# Patient Record
Sex: FEMALE | Race: WHITE | URBAN - METROPOLITAN AREA
[De-identification: names, ages, dates, MRNs, and addresses within clinical notes are randomized per-mention and may not be internally consistent; named-entity substitution may affect disease eponyms.]

---

## 2017-01-20 ENCOUNTER — OFFICE VISIT (OUTPATIENT)
Dept: FAMILY MEDICINE | Facility: CLINIC | Age: 62
End: 2017-01-20
Payer: COMMERCIAL

## 2017-01-20 VITALS
BODY MASS INDEX: 42.49 KG/M2 | HEIGHT: 63 IN | SYSTOLIC BLOOD PRESSURE: 130 MMHG | HEART RATE: 68 BPM | RESPIRATION RATE: 16 BRPM | DIASTOLIC BLOOD PRESSURE: 76 MMHG | WEIGHT: 239.8 LBS | TEMPERATURE: 98.3 F

## 2017-01-20 DIAGNOSIS — R73.01 IMPAIRED FASTING GLUCOSE: ICD-10-CM

## 2017-01-20 DIAGNOSIS — Z12.31 VISIT FOR SCREENING MAMMOGRAM: ICD-10-CM

## 2017-01-20 DIAGNOSIS — E78.5 HYPERLIPIDEMIA WITH TARGET LDL LESS THAN 130: ICD-10-CM

## 2017-01-20 DIAGNOSIS — L98.9 SKIN LESION: ICD-10-CM

## 2017-01-20 DIAGNOSIS — E66.01 MORBID OBESITY DUE TO EXCESS CALORIES (H): ICD-10-CM

## 2017-01-20 DIAGNOSIS — Z00.00 ENCOUNTER FOR PREVENTIVE HEALTH EXAMINATION: Primary | ICD-10-CM

## 2017-01-20 DIAGNOSIS — Z12.4 SCREENING FOR MALIGNANT NEOPLASM OF CERVIX: ICD-10-CM

## 2017-01-20 DIAGNOSIS — Z11.59 NEED FOR HEPATITIS C SCREENING TEST: ICD-10-CM

## 2017-01-20 LAB
CHOLEST SERPL-MCNC: 253 MG/DL
GLUCOSE SERPL-MCNC: 115 MG/DL (ref 70–99)
HBA1C MFR BLD: 6.1 % (ref 4.3–6)
HCV AB SERPL QL IA: NORMAL
HDLC SERPL-MCNC: 53 MG/DL
LDLC SERPL CALC-MCNC: 173 MG/DL
NONHDLC SERPL-MCNC: 200 MG/DL
TRIGL SERPL-MCNC: 137 MG/DL

## 2017-01-20 PROCEDURE — 87624 HPV HI-RISK TYP POOLED RSLT: CPT | Performed by: FAMILY MEDICINE

## 2017-01-20 PROCEDURE — 82947 ASSAY GLUCOSE BLOOD QUANT: CPT | Performed by: FAMILY MEDICINE

## 2017-01-20 PROCEDURE — 80061 LIPID PANEL: CPT | Performed by: FAMILY MEDICINE

## 2017-01-20 PROCEDURE — 83036 HEMOGLOBIN GLYCOSYLATED A1C: CPT | Performed by: FAMILY MEDICINE

## 2017-01-20 PROCEDURE — G0145 SCR C/V CYTO,THINLAYER,RESCR: HCPCS | Performed by: FAMILY MEDICINE

## 2017-01-20 PROCEDURE — 36415 COLL VENOUS BLD VENIPUNCTURE: CPT | Performed by: FAMILY MEDICINE

## 2017-01-20 PROCEDURE — 99396 PREV VISIT EST AGE 40-64: CPT | Performed by: FAMILY MEDICINE

## 2017-01-20 PROCEDURE — 86803 HEPATITIS C AB TEST: CPT | Performed by: FAMILY MEDICINE

## 2017-01-20 NOTE — MR AVS SNAPSHOT
After Visit Summary   2017    Marcelle Cheema    MRN: [id number]           Patient Information     Date Of Birth          1955        Visit Information        Provider Department      2017 8:20 AM Jessica Nguyen MD [facility]        Today's Diagnoses     Encounter for preventive health examination    -  1     Hyperlipidemia with target LDL less than 130         Impaired fasting glucose         Skin lesion         Visit for screening mammogram         Need for hepatitis C screening test         Screening for malignant neoplasm of cervix           Care Instructions    To schedule a future appointment:    [facility] (xray, mammogram, bone density, and ultrasound) [phone]      [facility] [phone]        Preventive Health Recommendations  Female Ages 50 - 64    Yearly exam: See your health care provider every year in order to  o Review health changes.   o Discuss preventive care.    o Review your medicines if your doctor has prescribed any.      Get a Pap test every three years (unless you have an abnormal result and your provider advises testing more often).    If you get Pap tests with HPV test, you only need to test every 5 years, unless you have an abnormal result.     You do not need a Pap test if your uterus was removed (hysterectomy) and you have not had cancer.    You should be tested each year for STDs (sexually transmitted diseases) if you're at risk.     Have a mammogram every 1 to 2 years.    Have a colonoscopy at age 50, or have a yearly FIT test (stool test). These exams screen for colon cancer.      Have a cholesterol test every 5 years, or more often if advised.    Have a diabetes test (fasting glucose) every three years. If you are at risk for diabetes, you should have this test more often.     If you are at risk for osteoporosis (brittle bone disease), think about having a bone  density scan (DEXA).    Shots: Get a flu shot each year. Get a tetanus shot every 10 years.    Nutrition:     Eat at least 5 servings of fruits and vegetables each day.    Eat whole-grain bread, whole-wheat pasta and brown rice instead of white grains and rice.    Talk to your provider about Calcium and Vitamin D.     Lifestyle    Exercise at least 150 minutes a week (30 minutes a day, 5 days a week). This will help you control your weight and prevent disease.    Limit alcohol to one drink per day.    No smoking.     Wear sunscreen to prevent skin cancer.     See your dentist every six months for an exam and cleaning.    See your eye doctor every 1 to 2 years.            Follow-ups after your visit        Future tests that were ordered for you today     Open Future Orders        Priority Expected Expires Ordered    MA SCREENING DIGITAL BILAT - Future  (s+30) Routine  1/20/2018 1/20/2017            Who to contact     If you have questions or need follow up information about today's clinic visit or your schedule please contact [facility] directly at [phone].  Normal or non-critical lab and imaging results will be communicated to you by [employer], letter or phone within 4 business days after the clinic has received the results. If you do not hear from us within 7 days, please contact the clinic through [employer] or phone. If you have a critical or abnormal lab result, we will notify you by phone as soon as possible.  Submit refill requests through L2 or call your pharmacy and they will forward the refill request to us. Please allow 3 business days for your refill to be completed.          Additional Information About Your Visit        L2 Information     L2 gives you secure access to your electronic health record. If you see a primary care provider, you can also send messages to your care team and make appointments. If you have questions, please call your primary care clinic.  If you  "do not have a primary care provider, please call [phone] and they will assist you.        Care EveryWhere ID     This is your Care EveryWhere ID. This could be used by other organizations to access your [facility] medical records  [id number]        Your Vitals Were     Pulse Temperature Respirations Height BMI (Body Mass Index)       68 98.3  F (36.8  C) (Oral) 16 5' 2.5\" (1.588 m) 43.13 kg/m2        Blood Pressure from Last 3 Encounters:   01/20/17 138/80   01/19/16 138/82   01/14/16 132/78    Weight from Last 3 Encounters:   01/20/17 239 lb 12.8 oz (108.773 kg)   01/19/16 236 lb (107.049 kg)   01/14/16 236 lb (107.049 kg)              We Performed the Following     Glucose     Hemoglobin A1c     Hepatitis C Screen Reflex to HCV RNA Quant and Genotype     HPV High Risk Types DNA Cervical     Lipid panel reflex to direct LDL     Pap imaged thin layer screen with HPV - recommended age 30 - 65 years (select HPV order below)        Primary Care Provider Office Phone # Fax #    Jessica Nguyen [phone]       [address] [address]        Thank you!     Thank you for choosing [facility]  for your care. Our goal is always to provide you with excellent care. Hearing back from our patients is one way we can continue to improve our services. Please take a few minutes to complete the written survey that you may receive in the mail after your visit with us. Thank you!             Your Updated Medication List - Protect others around you: Learn how to safely use, store and throw away your medicines at www.disposemymeds.org.          This list is accurate as of: 1/20/17  9:06 AM.  Always use your most recent med list.                   Brand Name Dispense Instructions for use    atorvastatin 40 MG tablet    LIPITOR    90 tablet    Take 1 tablet (40 mg) by mouth daily         "

## 2017-01-20 NOTE — PROGRESS NOTES
SUBJECTIVE:     CC: Marcelle Cheema is an 61 year old woman who presents for preventive health visit.     Healthy Habits:    Do you get at least three servings of calcium containing foods daily (dairy, green leafy vegetables, etc.)? yes    Amount of exercise or daily activities, outside of work: 0 day(s) per week    Problems taking medications regularly No    Medication side effects: No    Have you had an eye exam in the past two years? no    Do you see a dentist twice per year? yes  Do you have sleep apnea, excessive snoring or daytime drowsiness?no    Mood   High amounts of stress with work, parents health, son's emotional health  She works 12 hour days, not getting regular sleep and over eating due to stress    Today's PHQ-2 Score:   PHQ-2 ( 1999 [employer]) 1/20/2017 10/26/2015   Q1: Little interest or pleasure in doing things 0 -   Q2: Feeling down, depressed or hopeless 1 -   PHQ-2 Score 1 -   Little interest or pleasure in doing things - Not at all   Feeling down, depressed or hopeless - Not at all   PHQ-2 Score - 0       Abuse: Current or Past(Physical, Sexual or Emotional)- No  Do you feel safe in your environment - Yes    Social History   Substance Use Topics     Smoking status: Never Smoker      Smokeless tobacco: Never Used     Alcohol Use: Yes      Comment: Rare     The patient does not drink >3 drinks per day nor >7 drinks per week.    Recent Labs   Lab Test  10/28/15   0741  02/26/14   1130   CHOL  166  185   HDL  48*  46*   LDL  93  110   TRIG  124  141   CHOLHDLRATIO  3.5  4.0       Reviewed orders with patient.  Reviewed health maintenance and updated orders accordingly - Yes    Mammo Decision Support:  Patient over age 50, mutual decision to screen reflected in health maintenance.    Pertinent mammograms are reviewed under the imaging tab.  History of abnormal Pap smear: YES - updated in Problem List and Health Maintenance accordingly  All Histories reviewed and updated in Epic.      ROS:  C:  "NEGATIVE for fever, chills, change in weight  I: NEGATIVE for worrisome rashes, moles or lesions  E: NEGATIVE for vision changes or irritation  ENT: NEGATIVE for ear, mouth and throat problems  R: NEGATIVE for significant cough or SOB  B: NEGATIVE for masses, tenderness or discharge  CV: NEGATIVE for chest pain, palpitations or peripheral edema  GI: NEGATIVE for nausea, abdominal pain, heartburn, or change in bowel habits  : NEGATIVE for unusual urinary or vaginal symptoms. No vaginal bleeding.  M: NEGATIVE for significant arthralgias or myalgia  N: NEGATIVE for weakness, dizziness or paresthesias  P: NEGATIVE for changes in mood or affect     This document serves as a record of the services and decisions personally performed and made by Jessica Nguyen. It was created on her behalf by Maria Luisa Palencia, a trained medical scribe. The creation of this document is based the provider's statements to the medical scribe.  Maria Luisa Palencia January 20, 2017 9:05 AM    Problem list, Medication list, Allergies, and Medical/Social/Surgical histories reviewed in [facility] and updated as appropriate.  OBJECTIVE:     /80 mmHg  Pulse 68  Temp(Src) 98.3  F (36.8  C) (Oral)  Resp 16  Ht 5' 2.5\" (1.588 m)  Wt 239 lb 12.8 oz (108.773 kg)  BMI 43.13 kg/m2  EXAM:  GENERAL APPEARANCE: healthy, alert, no distress and morbidly obese  EYES: Eyes grossly normal to inspection, PERRL and conjunctivae and sclerae normal  HENT: ear canals and TM's normal, nose and mouth without ulcers or lesions, oropharynx clear and oral mucous membranes moist  NECK: no adenopathy, no asymmetry, masses, or scars and thyroid normal to palpation  RESP: lungs clear to auscultation - no rales, rhonchi or wheezes  BREAST: normal without masses, tenderness or nipple discharge and no palpable axillary masses or adenopathy  CV: regular rate and rhythm, normal S1 S2, no S3 or S4, no murmur, click or rub, no peripheral edema and peripheral pulses strong  ABDOMEN: " soft, nontender, no hepatosplenomegaly, no masses and bowel sounds normal   (female): normal female external genitalia, normal urethral meatus, vaginal mucosal atrophy noted and difficult to visualize the cervix due to discomfort and body habitus  MS: no musculoskeletal defects are noted and gait is age appropriate without ataxia  SKIN: small superficial brown spot on left areola, appears like a keratosis.    Mild irritation noted around left nipple, erythematous  NEURO: Normal strength and tone, sensory exam grossly normal, mentation intact and speech normal  PSYCH: mentation appears normal and affect normal/bright    ASSESSMENT/PLAN:     Marcelle was seen today for physical and medication reconciliation.    Diagnoses and all orders for this visit:    Encounter for preventive health examination    Hyperlipidemia with target LDL less than 130  -     Lipid panel reflex to direct LDL  -  patient has not been compliant with statin and is willing to go back on it     overweight with BMI >40   - discussed stress reduction, offered therapy which she declines at this time     Impaired fasting glucose  -     Glucose  -     Hemoglobin A1c    Skin lesion   - she will make an appointment with her dermatologist     I want her to have the skin lesion on left breast as well as nipple itching discussed    Visit for screening mammogram  -     MA SCREENING DIGITAL BILAT - Future  (s+30); Future      Need for hepatitis C screening test  -     Hepatitis C Screen Reflex to HCV RNA Quant and Genotype    Screening for malignant neoplasm of cervix  -     HPV High Risk Types DNA Cervical  -     Pap imaged thin layer screen with HPV - recommended age 30 - 65 years (select HPV order below)            COUNSELING:   Reviewed preventive health counseling, as reflected in patient instructions       Regular exercise       Healthy diet/nutrition       Vision screening       Hearing screening       Osteoporosis Prevention/Bone Health       Colon  "cancer screening       Consider Hep C screening for patients born between 1945 and 1965       The 10-year ASCVD risk score (Bernard ALONZO JrPhyllis, et al., 2013) is: 4%    Values used to calculate the score:      Age: 61 years      Sex: Female      Is an : No      Diabetic: No      Tobacco smoker: No      Systolic Blood Pressure: 138 mmHg      Prescribed Antihypertensives: No      HDL Cholesterol: 48 mg/dL      Total Cholesterol: 166 mg/dL    BP Screening:   Last 3 BP Readings:    BP Readings from Last 3 Encounters:   01/20/17 138/80   01/19/16 138/82   01/14/16 132/78       The following was recommended to the patient:  Re-screen BP within a year and recommended lifestyle modifications       reports that she has never smoked. She has never used smokeless tobacco.    Estimated body mass index is 43.13 kg/(m^2) as calculated from the following:    Height as of this encounter: 5' 2.5\" (1.588 m).    Weight as of this encounter: 239 lb 12.8 oz (108.773 kg).   Weight management plan: Discussed healthy diet and exercise guidelines and patient will follow up in 12 months in clinic to re-evaluate.    Counseling Resources:  ATP IV Guidelines  Pooled Cohorts Equation Calculator  Breast Cancer Risk Calculator  FRAX Risk Assessment  ICSI Preventive Guidelines  Dietary Guidelines for Americans, 2010  USDA's MyPlate  ASA Prophylaxis  Lung CA Screening    The information in this document, created by the medical scribe, Maria Luisa Palencia, for me, accurately reflects the services I personally performed and the decisions made by me. I have reviewed and approved this document for accuracy prior to leaving the patient care area.  Jessica Nguyen, January 20, 2017 9:05 AM      Jessica Nguyen MD  [facility]  "

## 2017-01-20 NOTE — NURSING NOTE
"Chief Complaint   Patient presents with     Physical     Patient is fasting today     Medication Reconciliation     Patient has NOT been taking her Lipitor x few months       Initial /80 mmHg  Pulse 68  Temp(Src) 98.3  F (36.8  C) (Oral)  Resp 16  Ht 5' 2.5\" (1.588 m)  Wt 239 lb 12.8 oz (108.773 kg)  BMI 43.13 kg/m2 Estimated body mass index is 43.13 kg/(m^2) as calculated from the following:    Height as of this encounter: 5' 2.5\" (1.588 m).    Weight as of this encounter: 239 lb 12.8 oz (108.773 kg).  BP completed using cuff size: large    "

## 2017-01-20 NOTE — PATIENT INSTRUCTIONS
To schedule a future appointment:    [facility] (xray, mammogram, bone density, and ultrasound) [phone]      [facility] [phone]        Preventive Health Recommendations  Female Ages 50 - 64    Yearly exam: See your health care provider every year in order to  o Review health changes.   o Discuss preventive care.    o Review your medicines if your doctor has prescribed any.      Get a Pap test every three years (unless you have an abnormal result and your provider advises testing more often).    If you get Pap tests with HPV test, you only need to test every 5 years, unless you have an abnormal result.     You do not need a Pap test if your uterus was removed (hysterectomy) and you have not had cancer.    You should be tested each year for STDs (sexually transmitted diseases) if you're at risk.     Have a mammogram every 1 to 2 years.    Have a colonoscopy at age 50, or have a yearly FIT test (stool test). These exams screen for colon cancer.      Have a cholesterol test every 5 years, or more often if advised.    Have a diabetes test (fasting glucose) every three years. If you are at risk for diabetes, you should have this test more often.     If you are at risk for osteoporosis (brittle bone disease), think about having a bone density scan (DEXA).    Shots: Get a flu shot each year. Get a tetanus shot every 10 years.    Nutrition:     Eat at least 5 servings of fruits and vegetables each day.    Eat whole-grain bread, whole-wheat pasta and brown rice instead of white grains and rice.    Talk to your provider about Calcium and Vitamin D.     Lifestyle    Exercise at least 150 minutes a week (30 minutes a day, 5 days a week). This will help you control your weight and prevent disease.    Limit alcohol to one drink per day.    No smoking.     Wear sunscreen to prevent skin cancer.     See your dentist every six months for an exam and  cleaning.    See your eye doctor every 1 to 2 years.

## 2017-01-24 LAB
COPATH REPORT: NORMAL
PAP: NORMAL

## 2017-01-26 LAB
FINAL DIAGNOSIS: NORMAL
HPV HR 12 DNA CVX QL NAA+PROBE: NEGATIVE
HPV16 DNA SPEC QL NAA+PROBE: NEGATIVE
HPV18 DNA SPEC QL NAA+PROBE: NEGATIVE
SPECIMEN DESCRIPTION: NORMAL

## 2017-02-02 ENCOUNTER — RADIANT APPOINTMENT (OUTPATIENT)
Dept: MAMMOGRAPHY | Facility: CLINIC | Age: 62
End: 2017-02-02
Attending: FAMILY MEDICINE

## 2017-02-02 DIAGNOSIS — Z12.31 VISIT FOR SCREENING MAMMOGRAM: ICD-10-CM

## 2017-03-22 DIAGNOSIS — E78.5 HYPERLIPIDEMIA WITH TARGET LDL LESS THAN 130: ICD-10-CM

## 2017-03-22 NOTE — TELEPHONE ENCOUNTER
Medication Detail      Disp Refills Start End SEAN   atorvastatin (LIPITOR) 40 MG tablet 90 tablet 3 10/28/2015  No   Sig: Take 1 tablet (40 mg) by mouth daily   Class: E-Prescribe   Notes to Pharmacy: Profile Rx: patient will contact pharmacy when needed   Route: Oral       Last Office Visit with [facility], [facility] or [facility] prescribing provider: 1/20/2017       Lab Results   Component Value Date    CHOL 253 01/20/2017     Lab Results   Component Value Date    HDL 53 01/20/2017     Lab Results   Component Value Date     01/20/2017     Lab Results   Component Value Date    TRIG 137 01/20/2017     Lab Results   Component Value Date    CHOLHDLRATIO 3.5 10/28/2015

## 2017-03-24 RX ORDER — ATORVASTATIN CALCIUM 40 MG/1
TABLET, FILM COATED ORAL
Qty: 90 TABLET | Refills: 2 | Status: SHIPPED | OUTPATIENT
Start: 2017-03-24 | End: 2018-01-26

## 2018-01-26 DIAGNOSIS — E78.5 HYPERLIPIDEMIA WITH TARGET LDL LESS THAN 130: ICD-10-CM

## 2018-01-29 NOTE — TELEPHONE ENCOUNTER
"Requested Prescriptions   Pending Prescriptions Disp Refills     atorvastatin (LIPITOR) 40 MG tablet [Pharmacy Med Name: ATORVASTATIN TAB 40MG]  Last Written Prescription Date:  3/24/2017  Last Fill Quantity: 90 tablet,  # refills: 2   Last Office Visit with G, P or [facility] prescribing provider:  1/20/2017  Future Office Visit:      90 tablet 2     Sig: TAKE 1 TABLET DAILY    Statins Protocol Failed    1/26/2018  7:51 PM       Failed - LDL on file in past 12 months    Recent Labs   Lab Test  01/20/17   0914   LDL  173*            Failed - Recent or future visit with authorizing provider    Patient had office visit in the last year or has a visit in the next 30 days with authorizing provider.  See \"Patient Info\" tab in inbasket, or \"Choose Columns\" in Meds & Orders section of the refill encounter.            Passed - No abnormal creatine kinase in past 12 months    No lab results found.         Passed - Patient is age 18 or older       Passed - No active pregnancy on record       Passed - No positive pregnancy test in past 12 months          "

## 2018-02-01 RX ORDER — ATORVASTATIN CALCIUM 40 MG/1
40 TABLET, FILM COATED ORAL DAILY
Qty: 90 TABLET | Refills: 0 | Status: SHIPPED | OUTPATIENT
Start: 2018-02-01

## 2018-02-01 NOTE — TELEPHONE ENCOUNTER
Medication is being filled for 1 time refill only due to:  Patient needs to be seen because she needs to establish care with another provider..   Santos Squires RN

## 2018-02-07 ENCOUNTER — RADIANT APPOINTMENT (OUTPATIENT)
Dept: MAMMOGRAPHY | Facility: CLINIC | Age: 63
End: 2018-02-07
Payer: COMMERCIAL

## 2018-02-07 DIAGNOSIS — Z12.39 SCREENING FOR BREAST CANCER: ICD-10-CM

## 2019-10-04 ENCOUNTER — HEALTH MAINTENANCE LETTER (OUTPATIENT)
Age: 64
End: 2019-10-04

## 2020-11-08 ENCOUNTER — HEALTH MAINTENANCE LETTER (OUTPATIENT)
Age: 65
End: 2020-11-08

## 2021-01-18 ENCOUNTER — OFFICE VISIT - RIVER FALLS (OUTPATIENT)
Dept: FAMILY MEDICINE | Facility: CLINIC | Age: 66
End: 2021-01-18

## 2021-01-18 ASSESSMENT — MIFFLIN-ST. JEOR: SCORE: 1631.78

## 2021-02-22 ENCOUNTER — OFFICE VISIT - RIVER FALLS (OUTPATIENT)
Dept: FAMILY MEDICINE | Facility: CLINIC | Age: 66
End: 2021-02-22

## 2021-02-22 ASSESSMENT — MIFFLIN-ST. JEOR: SCORE: 1594.58

## 2021-09-11 ENCOUNTER — HEALTH MAINTENANCE LETTER (OUTPATIENT)
Age: 66
End: 2021-09-11

## 2022-01-01 ENCOUNTER — HEALTH MAINTENANCE LETTER (OUTPATIENT)
Age: 67
End: 2022-01-01

## 2022-02-11 VITALS
BODY MASS INDEX: 44.68 KG/M2 | HEIGHT: 62 IN | DIASTOLIC BLOOD PRESSURE: 83 MMHG | SYSTOLIC BLOOD PRESSURE: 120 MMHG | WEIGHT: 242.8 LBS

## 2022-02-11 VITALS — WEIGHT: 251 LBS | HEIGHT: 62 IN | BODY MASS INDEX: 46.19 KG/M2

## 2022-02-16 NOTE — LETTER
(Inserted Image. Unable to display)   August 26, 2021  CARIN SANTAMARIA  [address] [place] [id number]          Dear CARIN,      Thank you for selecting [facility] for your healthcare needs.    Your Healthcare Provider has recommended that you schedule a diabetes management appointment with our Certified Diabetes Educator, Cindy Zamarripa RD, CD, CDE.     Please bring your glucose meter and your blood glucose diary to your appointment.    Available services include:  - Education about diabetes with guidance and support   - Education on the 7 Self Care Behaviors for Diabetes Management:     Healthy Eating   portion control, label readings, carbohydrate recommendations, heart healthy guidelines, meal planning    Being Active - Physical activity guidelines     Monitoring   blood glucose targets, evaluation of blood glucose readings and lab results    Taking Medication   medication management    Problem Solving   discuss any concerns/ questions     Healthy Coping   disease progression and management    Reducing Risks   prevention strategies to help avoid potential complications related to uncontrolled diabetes  - Weight loss strategies      (FYI   Regarding office visit: In some instances, a video visit may be offered as an option.)        To schedule an appointment or if you have further questions, please contact your clinic at [phone].      Powered by [employer]    Sincerely,    Cindy Zamarripa R.D., C.D., C.D.E.

## 2022-02-16 NOTE — NURSING NOTE
Quick Intake Entered On:  1/20/2021 4:51 PM CST    Performed On:  1/18/2021 4:51 PM CST by Cindy Zamarripa               Summary   Weight Measured :   251 lb(Converted to: 251 lb 0 oz, 113.852 kg)    Height Measured :   62 in(Converted to: 5 ft 2 in, 157.48 cm)    Body Mass Index :   45.9 kg/m2 (HI)    Body Surface Area :   2.23 m2   Cindy Zamarripa - 1/20/2021 4:51 PM CST

## 2022-02-16 NOTE — PROGRESS NOTES
Patient:   CARIN SANTAMARIA            MRN: [id number]            FIN: [id number]               Age:   65 years     Sex:  Female     :  1955   Associated Diagnoses:   Type 2 diabetes mellitus without complications; Morbid (severe) obesity due to excess calories; Mixed hyperlipidemia; Essential (primary) hypertension   Author:   Cindy Zamarripa      Visit Information   Visit type:  Diabetes Self Management Education .    Referral source:  Ryan KOWALSKI, [facility] .       Chief Complaint   DM Type II, HTN, Hyperlipidemia, Obesity       History of Present Illness   Discussed nutrition, diabetes, and lifestyle management with pt.  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   Stress:   mod  Sleep: will instruct during follow up consults   Support:   BG Testing: education today   DM related medication: none at this time   Routine diabetes care:  will discuss during follow up consults    Dilated Retinal Eye Exam:    Skin:   Dental:   Feet:   Immunizations:   Hgb A1c: 6.9% = 151 mg/dL estimated average glucose      Health Status   Allergies:    Allergic Reactions (Selected)  No known allergies   Medications:    Medications          No medications documented     Problem list:    All Problems (Selected)  Essential (primary) hypertension / SNOMED CT 30331836 / Confirmed  Mixed hyperlipidemia / SNOMED CT 444919516 / Confirmed  Morbid (severe) obesity due to excess calories / SNOMED CT 049662619 / Confirmed  Type 2 diabetes mellitus without complications / SNOMED CT 797170964 / Confirmed      Histories   Past Medical History:    Active  Mixed hyperlipidemia (572281199)  Type 2 diabetes mellitus without complications ()  Essential (primary)  hypertension (96846974)  Morbid (severe) obesity due to excess calories (819686660)  Resolved  History of iritis (438992605):  Resolved.  Comments:  4/3/2018 CDT 3:52 PM CDT - Stephenie Vasques  Only one episode.   Family History:    No family history items have been selected or recorded.   Procedure history:    Mammogram (SNOMED CT 717603100) on 11/11/2019 at 64 Years.  Neurological stereotactic surgery system (SNOMED CT 4191969632).  Comments:  4/3/2018 3:51 PM CDT - Stephenie Vasques  1990s - AV malformation with a intracranial bleed.  D&C - Dilatation and curettage (SNOMED CT 2900507967).  Tonsillectomy (SNOMED CT 386037325).   Social History:        Alcohol Assessment: Denies Alcohol Use      Tobacco Assessment: Past      Employment and Education Assessment            Work/School description: [employer].      Home and Environment Assessment            Marital status: .  Spouse/Partner name: Judson.      Nutrition and Health Assessment            Type of diet: Regular.      Exercise and Physical Activity Assessment: Does not exercise        Impression and Plan   Diagnosis     Type 2 diabetes mellitus without complications (XBX44-JY E11.9).     Morbid (severe) obesity due to excess calories (VIP16-BH E66.01).     Mixed hyperlipidemia (NET54-VG E78.2).     Essential (primary) hypertension (BPJ54-OS I10).         Counseled: Patient, SHIRLENE Self Care Behaviors   Today the patient was instructed on the basic physiology of diabetes mellitus, BG testing, and lifestyle guidelines.  Healthy Eating - Education on what foods are primary sources of carbohydrates and how intake affects BG readings, edu on carbohydrate counting, reading food labels, appropriate portion sizes, well balanced meals, diabetic plate method as a general rule.  Patient is provided with numerous ideas to incorporate dietary recommendations, meal planning and preparation, mindful eating techniques and weight loss tips.    Being Active  - Education on how exercise helps with :    - lowering BG by increasing the muscles ability to take up and use glucose   - weight loss   - healthier heart (improve lipid profile)   - improve sleep, mood, energy   - decrease stress      Monitoring - Today the patient is instructed on home blood glucose monitoring.  Pt is provided with a meter.  Pt is instructed on the proper use of the meter, recommended testing schedule and blood glucose target levels.  The patient is able to return appropriate demonstration of the use of the meter.  Pt is instructed on proper disposal of lancets.    Taking Medication - Discussed gradual progression of diabetes and potential to add medication in the future.      Problem Solving - medical support team assistance, resources provided (written and apps, internet); good control of stress  Healthy Coping - support of friends, family, and medical support team   Reducing Risks - Will discuss at f/u apts.  Weight loss goal of 7 - 10% of current body weight pounds as a reasonable goal to help increase insulin sensitivity   .        Goals:  1.  A1c <6.5%  2.  BG testing 2x/ day on 3 days/ week before eating 80 - 130 target; two hours after eating 80 - 150mg/dL  3.  Physical active 60 min/ week   4.  Carb controlled heart healthy meal plan <130gm CHO/ day; <200mg Cholesterol/ day   5.  F/u in 6 weeks       Professional Services   Time spent with pt 60 min  cc Dr. Davis   cc Dr. [facility]

## 2022-02-16 NOTE — NURSING NOTE
Quick Intake Entered On:  2/22/2021 1:59 PM CST    Performed On:  2/22/2021 1:57 PM CST by Cindy Zamarripa               Summary   Weight Measured :   242.8 lb(Converted to: 242 lb 13 oz, 110.132 kg)    Height Measured :   62 in(Converted to: 5 ft 2 in, 157.48 cm)    Body Mass Index :   44.4 kg/m2 (HI)    Body Surface Area :   2.19 m2   Systolic Blood Pressure :   120 mmHg   Diastolic Blood Pressure :   83 mmHg (HI)    Mean Arterial Pressure :   95 mmHg   Cindy Zamarripa - 2/22/2021 1:57 PM CST

## 2022-04-23 ENCOUNTER — HEALTH MAINTENANCE LETTER (OUTPATIENT)
Age: 67
End: 2022-04-23

## 2022-10-29 ENCOUNTER — HEALTH MAINTENANCE LETTER (OUTPATIENT)
Age: 67
End: 2022-10-29

## 2023-04-08 ENCOUNTER — HEALTH MAINTENANCE LETTER (OUTPATIENT)
Age: 68
End: 2023-04-08

## 2024-08-05 NOTE — TELEPHONE ENCOUNTER
Prescription approved per [facility] Refill Protocol.   Kathleen Higginbotham RN   March 24, 2017 11:01 AM  [facility]   [phone]      negative